# Patient Record
(demographics unavailable — no encounter records)

---

## 2024-10-26 NOTE — PHYSICAL EXAM
[No Acute Distress] : no acute distress [Well Nourished] : well nourished [Well Developed] : well developed [Well-Appearing] : well-appearing [Normal Voice/Communication] : normal voice/communication [Normal Sclera/Conjunctiva] : normal sclera/conjunctiva [Normal Outer Ear/Nose] : the outer ears and nose were normal in appearance [Normal Oropharynx] : the oropharynx was normal [Normal TMs] : both tympanic membranes were normal [No Lymphadenopathy] : no lymphadenopathy [Supple] : supple [No Respiratory Distress] : no respiratory distress  [No Accessory Muscle Use] : no accessory muscle use [Clear to Auscultation] : lungs were clear to auscultation bilaterally [Normal Rate] : normal rate  [Regular Rhythm] : with a regular rhythm [Normal S1, S2] : normal S1 and S2 [Normal Mood] : the mood was normal

## 2024-10-26 NOTE — PLAN
[FreeTextEntry1] : He was advised that the cough may linger for another 5-7 days.  He was offered a rx for a steroid inhaler but declined.  He was advised to take benzonatate 100 mg 2-3 times a day as needed and to follow up if the fever recurs or for any other worsening symptoms.

## 2024-10-26 NOTE — REVIEW OF SYSTEMS
[Fever] : no fever [Chills] : no chills [Postnasal Drip] : postnasal drip [Nasal Discharge] : nasal discharge [Shortness Of Breath] : no shortness of breath [Wheezing] : wheezing [Cough] : cough [Negative] : Heme/Lymph

## 2024-10-26 NOTE — HISTORY OF PRESENT ILLNESS
[FreeTextEntry8] : pt presents with lingering cough, runny nose, fevers and aches have gone away, slight chest tightness negative covid.  His symptoms began about 10 days ago and he had a fever.  He still has a lot of nasal discharge and a persistent cough.  He feels a vibration in his chest.  He tested negative for Covid this morning.  He isn't taking any over the counter medications.

## 2025-06-09 NOTE — PLAN
[FreeTextEntry1] : BP in office within goal range  BP goal <140/80  healthy diet and physical activity as tolerated continue Norvasc and losartan   neck mass  sent for US neck

## 2025-06-09 NOTE — HISTORY OF PRESENT ILLNESS
[FreeTextEntry8] : 72M is here due to 1 week of nek pain and his daughters noticed a lump on back of his neck. He has been taking Motrin for pain. Denies rash.